# Patient Record
Sex: FEMALE | Race: WHITE | URBAN - METROPOLITAN AREA
[De-identification: names, ages, dates, MRNs, and addresses within clinical notes are randomized per-mention and may not be internally consistent; named-entity substitution may affect disease eponyms.]

---

## 2017-02-17 ENCOUNTER — OFFICE VISIT (OUTPATIENT)
Dept: FAMILY MEDICINE CLINIC | Age: 22
End: 2017-02-17

## 2017-02-17 VITALS
SYSTOLIC BLOOD PRESSURE: 114 MMHG | HEART RATE: 111 BPM | HEIGHT: 65 IN | BODY MASS INDEX: 22.49 KG/M2 | WEIGHT: 135 LBS | DIASTOLIC BLOOD PRESSURE: 70 MMHG | RESPIRATION RATE: 16 BRPM | OXYGEN SATURATION: 100 % | TEMPERATURE: 99.7 F

## 2017-02-17 DIAGNOSIS — B08.5 HERPANGINA: Primary | ICD-10-CM

## 2017-02-17 DIAGNOSIS — J06.9 VIRAL UPPER RESPIRATORY ILLNESS: ICD-10-CM

## 2017-02-17 RX ORDER — PETROLATUM,WHITE
OINTMENT IN PACKET (GRAM) TOPICAL AS NEEDED
Qty: 20 G | Refills: 0 | Status: SHIPPED | OUTPATIENT
Start: 2017-02-17 | End: 2019-01-23 | Stop reason: ALTCHOICE

## 2017-02-17 RX ORDER — ACETAMINOPHEN 325 MG/1
TABLET ORAL
COMMUNITY
End: 2019-01-23

## 2017-02-17 NOTE — LETTER
NOTIFICATION RETURN TO WORK / SCHOOL 
 
2/17/2017 5:01 PM 
 
Ms. Rick Frank [address] [place] [facility] [id number] To Whom It May Concern: 
 
Rick Frank is currently under the care of [employer]. She will return to work/school on: 2/20/17 If there are questions or concerns please have the patient contact our office. Sincerely, Jaylon Carson MD

## 2017-02-17 NOTE — PROGRESS NOTES
Sick since Monday  Febrile on Tuesday and Wednesday  Nasal congestion, cough, + sore throat  Attends [employer]    Woke up on Wednesday with cracked lips    Tmax 101.5 Tuesday    Today afebrile    No rash    Suspect viral syndrome    Recommend magic mouthwash    F/U prn    I reviewed with the resident the medical history and the resident's findings on the physical examination. I discussed with the resident the patient's diagnosis and concur with the plan.

## 2017-02-17 NOTE — MR AVS SNAPSHOT
Visit Information Date & Time Provider Department Dept. Phone Encounter #  
 2/17/2017  3:25 PM Ike Delgadillo, Chiquis Wheat [phone] [id number] Upcoming Health Maintenance Date Due INFLUENZA AGE 9 TO ADULT 8/1/2016 PAP AKA CERVICAL CYTOLOGY 9/28/2016 DTaP/Tdap/Td series (7 - Td) 4/21/2026 Allergies as of 2/17/2017  Review Complete On: 2/17/2017 By: Ike Delgadillo MD  
 No Known Allergies Current Immunizations  Never Reviewed Name Date DTaP 10/19/1999, 5/14/1997, 3/25/1996, 1/29/1996, 1995 HPV 9/26/2014, 8/27/2014 HPV (Quad) 4/3/2015 10:36 AM  
 Hep A Vaccine 8/27/2014 Hep B Vaccine 7/5/1996, 1/29/1996, 1995 Hib 9/27/1996, 3/25/1996, 1/29/1996, 1995 Influenza Vaccine 11/24/2010, 11/25/2008, 11/21/2007 MMR 10/19/1999, 1/27/1997 Meningococcal Vaccine 8/27/2014, 8/21/2007 Pneumococcal Vaccine (Unspecified Type) 10/19/1999, 3/25/1996, 1/29/1996, 1995 Tdap 4/21/2016, 8/12/1997 Varicella Virus Vaccine 8/27/2014, 11/12/1997 Not reviewed this visit Vitals BP Pulse Temp Resp Height(growth percentile) Weight(growth percentile) 114/70 (!) 111 99.7 °F (37.6 °C) (Oral) 16 5' 5\" (1.651 m) 135 lb (61.2 kg) SpO2 BMI OB Status Smoking Status 100% 22.47 kg/m2 Having regular periods Never Smoker BMI and BSA Data Body Mass Index Body Surface Area  
 22.47 kg/m 2 1.68 m 2 Preferred Pharmacy Pharmacy Name Phone [facility] [address] [phone] Your Updated Medication List  
  
   
This list is accurate as of: 2/17/17  5:01 PM.  Always use your most recent med list.  
  
  
  
  
 levonorgestrel 20 mcg/24 hr (5 years) IUD Commonly known as:  MIRENA  
1 Each by IntraUTERine route once. magic mouthwash solution Magic mouth wash  Maalox Lidocaine 2% viscous  Diphenhydramine oral solution   Pharmacy to mix equal portions of ingredients to a total volume as indicated in the dispense amount. TYLENOL 325 mg tablet Generic drug:  acetaminophen Take  by mouth every four (4) hours as needed for Pain.  
  
 white petrolatum topical onitment Commonly known as:  VASELINE Apply  to affected area as needed for Lubrication. Prescriptions Printed Refills  
 magic mouthwash solution 0 Sig: Magic mouth wash Maalox Lidocaine 2% viscous Diphenhydramine oral solution Pharmacy to mix equal portions of ingredients to a total volume as indicated in the dispense amount. Class: Print Prescriptions Sent to Pharmacy Refills  
 white petrolatum (VASELINE) topical onitment 0 Sig: Apply  to affected area as needed for Lubrication. Class: Normal  
 Pharmacy: [address] [place], [place] [address] [facility] #: [phone] Route: Topical  
  
Patient Instructions Herpangina in Children: Care Instructions Your Care Instructions Herpangina (say \"NOP-moad-RF-nuh\") is an illness that is caused by a virus. It causes sores inside the mouth, a sore throat, and a high fever. Adults usually do not get it. Herpangina easily spreads to other children through exposure to a sick child's runny nose or saliva. While herpangina can make your child feel very ill for a few days, this illness usually clears up within a week. The most common concern is that your child may get dehydrated because it is painful to swallow. You can use home treatment to reduce your child's pain and discomfort. Since this illness is caused by a virus, antibiotic medicine is not used to treat it. Follow-up care is a key part of your child's treatment and safety. Be sure to make and go to all appointments, and call your doctor if your child is having problems.  It's also a good idea to know your child's test results and keep a list of the medicines your child takes. How can you care for your child at home? · Give acetaminophen (Tylenol) or ibuprofen (Advil, Motrin) for fever, pain, or fussiness. Read and follow all instructions on the label. Do not give aspirin to anyone younger than 20. It has been linked to Reye syndrome, a serious illness. · Do not give your child over-the-counter antidiarrhea or upset-stomach medicines without talking to your doctor first. Kyra Newman not give Pepto-Bismol or other medicines that contain salicylates, a form of aspirin, or aspirin. Aspirin has been linked to Reye syndrome, a serious illness. · Make sure your child rests. Keep your child home as long as he or she has a fever. · Have your child drink plenty of fluids. Warm fluids such as soup, warm water, or warm lemonade may ease throat pain. Ice cream, gelatin dessert, and sherbet can also soothe the throat. · If your child is eating solids, try offering bland foods, such as yogurt and warm cereal. 
· Watch for and treat signs of dehydration, which means that the body has lost too much water. Your child's mouth may feel very dry. He or she may have sunken eyes with few tears when crying. Your child may lack energy and want to be held a lot. He or she may not urinate as often as usual. 
· Give your child lots of fluids, enough so that the urine is light yellow or clear like water. This is very important if your child is vomiting or has diarrhea. Give your child sips of water or drinks such as Pedialyte or Infalyte. These drinks contain a mix of salt, sugar, and minerals. You can buy them at drugstores or grocery stores. Give these drinks as long as your child is throwing up or has diarrhea. Do not use them as the only source of liquids or food for more than 12 to 24 hours. · Wash your hands after changing diapers and before you touch food. Have your child wash his or her hands after using the toilet and before eating. When should you call for help? Call 911 anytime you think your child may need emergency care. For example, call if: 
· Your child has severe trouble breathing. Signs may include the chest sinking in, using belly muscles to breathe, or nostrils flaring while your child is struggling to breathe. · Your child is confused, does not know where he or she is, or is extremely sleepy or hard to wake up. · Your child passes out (loses consciousness). · Your child has a seizure. Call your doctor now or seek immediate medical care if: 
· Your child has a fever with a stiff neck or a severe headache. · Your child still has a fever after 5 days of home treatment. · Your child has signs of needing more fluids. These signs include sunken eyes with few tears, a dry mouth with little or no spit, and little or no urine for 6 hours. Watch closely for changes in your child's health, and be sure to contact your doctor if: 
· Your child's mouth sores and sore throat get worse or are not improving. · Your child does not get better as expected. Where can you learn more? Go to http://osmani-stanley.info/. Enter G012 in the search box to learn more about \"Herpangina in Children: Care Instructions. \" Current as of: July 26, 2016 Content Version: 11.1 © 0814-6978 [employer]. Care instructions adapted under license by [employer] (which disclaims liability or warranty for this information). If you have questions about a medical condition or this instruction, always ask your healthcare professional. [place] [address] any warranty or liability for your use of this information. Introducing hospitals & HEALTH SERVICES! [facility] introduces Care Thread patient portal. Now you can access parts of your medical record, email your doctor's office, and request medication refills online. 1. In your internet browser, go to [email or web address] 2. Click on the First Time User? Click Here link in the Sign In box. You will see the New Member Sign Up page. 3. Enter your [employer] Access Code exactly as it appears below. You will not need to use this code after youve completed the sign-up process. If you do not sign up before the expiration date, you must request a new code. · [employer] Access Code: [email or web address] Expires: 5/18/2017  3:33 PM 
 
4. Enter the last four digits of your Social Security Number (xxxx) and Date of Birth (mm/dd/yyyy) as indicated and click Submit. You will be taken to the next sign-up page. 5. Create a [employer] Logic ID. This will be your [employer] login ID and cannot be changed, so think of one that is secure and easy to remember. 6. Create a [employer] password. You can change your password at any time. 7. Enter your Password Reset Question and Answer. This can be used at a later time if you forget your password. 8. Enter your e-mail address. You will receive e-mail notification when new information is available in [address] 9. Click Sign Up. You can now view and download portions of your medical record. 10. Click the Download Summary menu link to download a portable copy of your medical information. If you have questions, please visit the Frequently Asked Questions section of the [employer] website. Remember, [employer] is NOT to be used for urgent needs. For medical emergencies, dial 911. Now available from your iPhone and Android! Please provide this summary of care documentation to your next provider. Your primary care clinician is listed as Sumit Gamble. If you have any questions after today's visit, please call [phone].

## 2017-02-17 NOTE — PROGRESS NOTES
Chief Complaint   Patient presents with    Fever     times 4 days    Headache     head, neck and ears     1. Have you been to the ER, urgent care clinic since your last visit? Hospitalized since your last visit? No      2. Have you seen or consulted any other health care providers outside of the [address] [place] [address] since your last visit? Include any pap smears or colon screening.  No

## 2017-02-17 NOTE — PROGRESS NOTES
Cindy Joyce is a 24 y.o. female    Issues discussed today include:    1)  Cold sxs:  Monday 2/13 started with general fatigue and fever, chills. Tmax 101.5F on Tues 2/14. Woke up 2/15 with red, swollen, cracked lips. Have since scabbed over. Never blistered or ulcerated. Lips are painful 4/10. Putting coconut oil and lip balm on lips with not much change. Lip swelling going down. Bottom lip worse than top. + fatigue, sleeping more than usually. Taken tylenol and advil for fevr with relief. + cough, nasal congestion, post-nasal drainage and mild sore throat. No rashes. No red or swollen tongue. No bleeding gums. Tried generic form of mucinex, helped a little. No smoking or etoh. No other drugs. 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 Pt wants to know her lips will not be like this permanently. Data reviewed or ordered today:       Other problems include:  Patient Active Problem List   Diagnosis Code    Menorrhagia N92.0    Dysmenorrhea N94.6       Medications:  Current Outpatient Prescriptions   Medication Sig Dispense Refill    levonorgestrel (MIRENA) 20 mcg/24 hr (5 years) IUD 1 Each by IntraUTERine route once.  acetaminophen (TYLENOL) 325 mg tablet Take  by mouth every four (4) hours as needed for Pain.  magic mouthwash solution Magic mouth wash   Maalox  Lidocaine 2% viscous   Diphenhydramine oral solution     Pharmacy to mix equal portions of ingredients to a total volume as indicated in the dispense amount. 300 mL 0    white petrolatum (VASELINE) topical onitment Apply  to affected area as needed for Lubrication. 20 g 0       Allergies:  No Known Allergies    LMP:  No LMP recorded. Social History     Social History    Marital status: SINGLE     Spouse name: N/A    Number of children: N/A    Years of education: N/A     Occupational History    Not on file.      Social History Main Topics    Smoking status: Never Smoker    Smokeless tobacco: Never Used    Alcohol use No    Drug use: No    Sexual activity: Yes     Birth control/ protection: Pill     Other Topics Concern    Not on file     Social History Narrative       Family History   Problem Relation Age of Onset    Diabetes Mother     Hypertension Mother     Cancer Maternal Aunt     Diabetes Maternal Grandmother     Diabetes Maternal Grandfather        ROS:   Chest Pain:  No  SOB:  No      Physical Exam  Visit Vitals    /70    Pulse (!) 111    Temp 99.7 °F (37.6 °C) (Oral)    Resp 16    Ht 5' 5\" (1.651 m)    Wt 135 lb (61.2 kg)    SpO2 100%    BMI 22.47 kg/m2     BP Readings from Last 3 Encounters:   02/17/17 114/70   09/08/16 140/78   06/23/16 129/69     Constitutional: Appears well,  No acute distress, Vitals noted  Psychiatric:  Affect anxious, tearful at times, Alert and Oriented to person/place/time  Eyes:  Conjunctiva clear, no drainage  ENT:  External ears and nose normal, Ms grey and non-bulging bilaterally. OP without erythema, edema or exudate. Bilateral nares without active drainage, edema or polyps. Mouth: Teeth appear healthy, Mucous membranes mildly dry. Gums mildly inflammed. Multiple aphthous ulcers in inner upper and lower lips. Bottom lip mildly edematous with three sores with overlying eschar. Neck:  General inspection normal. Supple. No lymphadenopathy. Lungs:  Clear to auscultation, good respiratory effort, no wheezes, rales or rhonchi  Heart:  Tachycardic with , Normal S1 and S2,  Regular rhythm. 2/6 systolic murmurs, no rubs or gallops. Abdomen: Soft, flat, + BS, nondistended, non-tender, no HSM or masses  Extremities:  Without edema, good peripheral pulses  Skin:  Warm to palpation, without rashes            Assessment/Plan:      ICD-10-CM ICD-9-CM    1. Herpangina B08.5 074.0    2.  Viral upper respiratory illness J06.9 465.9     B97.89       Reassurance given  Magic mouthwash for swish and spit + application to lips  Use Vaseline several times daily to keep moist and for healing  Continue motrin, tylenol prn for fever - Please RTC if high fevers > 102F persistent or other new sxs  Suspect flow murmur d/t dehydration and viral illness - will consider echo is persistent at wellness visits    Follow-up Disposition:  Return if symptoms worsen or fail to improve. AVS was printed, given to patient and briefly discussed prior to patient's departure from the office today. Patient discussed with attendings, Dr. Paresh Natarajan and Phil Spears.  Susanna Epps MD  [address] [facility]  [address]  [address]

## 2017-02-17 NOTE — PATIENT INSTRUCTIONS
Herpangina in Children: Care Instructions  Your Care Instructions  Herpangina (say \"DDN-qkna-JM-nuh\") is an illness that is caused by a virus. It causes sores inside the mouth, a sore throat, and a high fever. Adults usually do not get it. Herpangina easily spreads to other children through exposure to a sick child's runny nose or saliva. While herpangina can make your child feel very ill for a few days, this illness usually clears up within a week. The most common concern is that your child may get dehydrated because it is painful to swallow. You can use home treatment to reduce your child's pain and discomfort. Since this illness is caused by a virus, antibiotic medicine is not used to treat it. Follow-up care is a key part of your child's treatment and safety. Be sure to make and go to all appointments, and call your doctor if your child is having problems. It's also a good idea to know your child's test results and keep a list of the medicines your child takes. How can you care for your child at home? · Give acetaminophen (Tylenol) or ibuprofen (Advil, Motrin) for fever, pain, or fussiness. Read and follow all instructions on the label. Do not give aspirin to anyone younger than 20. It has been linked to Reye syndrome, a serious illness. · Do not give your child over-the-counter antidiarrhea or upset-stomach medicines without talking to your doctor first. Leonardo Coates not give Pepto-Bismol or other medicines that contain salicylates, a form of aspirin, or aspirin. Aspirin has been linked to Reye syndrome, a serious illness. · Make sure your child rests. Keep your child home as long as he or she has a fever. · Have your child drink plenty of fluids. Warm fluids such as soup, warm water, or warm lemonade may ease throat pain. Ice cream, gelatin dessert, and sherbet can also soothe the throat.   · If your child is eating solids, try offering bland foods, such as yogurt and warm cereal.  · Watch for and treat signs of dehydration, which means that the body has lost too much water. Your child's mouth may feel very dry. He or she may have sunken eyes with few tears when crying. Your child may lack energy and want to be held a lot. He or she may not urinate as often as usual.  · Give your child lots of fluids, enough so that the urine is light yellow or clear like water. This is very important if your child is vomiting or has diarrhea. Give your child sips of water or drinks such as Pedialyte or Infalyte. These drinks contain a mix of salt, sugar, and minerals. You can buy them at drugstores or grocery stores. Give these drinks as long as your child is throwing up or has diarrhea. Do not use them as the only source of liquids or food for more than 12 to 24 hours. · Wash your hands after changing diapers and before you touch food. Have your child wash his or her hands after using the toilet and before eating. When should you call for help? Call 911 anytime you think your child may need emergency care. For example, call if:  · Your child has severe trouble breathing. Signs may include the chest sinking in, using belly muscles to breathe, or nostrils flaring while your child is struggling to breathe. · Your child is confused, does not know where he or she is, or is extremely sleepy or hard to wake up. · Your child passes out (loses consciousness). · Your child has a seizure. Call your doctor now or seek immediate medical care if:  · Your child has a fever with a stiff neck or a severe headache. · Your child still has a fever after 5 days of home treatment. · Your child has signs of needing more fluids. These signs include sunken eyes with few tears, a dry mouth with little or no spit, and little or no urine for 6 hours. Watch closely for changes in your child's health, and be sure to contact your doctor if:  · Your child's mouth sores and sore throat get worse or are not improving.   · Your child does not get better as expected. Where can you learn more? Go to http://osmani-stanley.info/. Enter G012 in the search box to learn more about \"Herpangina in Children: Care Instructions. \"  Current as of: July 26, 2016  Content Version: 11.1  © 9469-9902 [employer] Incorporated. Care instructions adapted under license by "[employer] (which disclaims liability or warranty for this information). If you have questions about a medical condition or this instruction, always ask your healthcare professional. [address] any warranty or liability for your use of this information.

## 2017-11-06 ENCOUNTER — OFFICE VISIT (OUTPATIENT)
Dept: FAMILY MEDICINE CLINIC | Age: 22
End: 2017-11-06

## 2017-11-06 VITALS
DIASTOLIC BLOOD PRESSURE: 73 MMHG | OXYGEN SATURATION: 100 % | RESPIRATION RATE: 18 BRPM | WEIGHT: 147 LBS | HEIGHT: 65 IN | BODY MASS INDEX: 24.49 KG/M2 | HEART RATE: 78 BPM | TEMPERATURE: 98.4 F | SYSTOLIC BLOOD PRESSURE: 122 MMHG

## 2017-11-06 DIAGNOSIS — N92.0 MENORRHAGIA WITH REGULAR CYCLE: ICD-10-CM

## 2017-11-06 DIAGNOSIS — M25.649 STIFFNESS OF HAND JOINT, UNSPECIFIED LATERALITY: ICD-10-CM

## 2017-11-06 DIAGNOSIS — Z13.0 SCREENING FOR IRON DEFICIENCY ANEMIA: ICD-10-CM

## 2017-11-06 DIAGNOSIS — Z78.9 CONSUMES A VEGAN DIET: Primary | ICD-10-CM

## 2017-11-06 NOTE — PROGRESS NOTES
Jazmin Reyes  25 y.o. female  1995  [address] [facility]  <[id number]>   [address]: Progress Note  Mikaela Collins MD       Encounter Date: 11/6/2017    Chief Complaint   Patient presents with    Referral Request     Dr. Starr Jain appointment Nov 30     History of Present Illness   Jazmin Reyes is a 25 y.o. female who presents to clinic today for referral for WWE to OB GYN. 2. She is also concerned about kervin hand stiffness. She is concerned for arthritis. She is ambidextrous but writes with her left hand. Denies any FHx of RA. Denies any swelling does note some stiffness. 3. She presents with her mother and desires labs today. Review of Systems   Review of Systems   Constitutional: Negative for fever. Musculoskeletal: Positive for joint pain (kervin hands). All other systems reviewed and are negative. Vitals/Objective:     Vitals:    11/06/17 0937   BP: 122/73   Pulse: 78   Resp: 18   Temp: 98.4 °F (36.9 °C)   TempSrc: Oral   SpO2: 100%   Weight: 147 lb (66.7 kg)   Height: 5' 5\" (1.651 m)     Body mass index is 24.46 kg/(m^2). Physical Exam   Constitutional: She appears well-developed and well-nourished. No distress. Neck: Neck supple. No thyromegaly present. Cardiovascular: Normal rate, regular rhythm and normal heart sounds. Exam reveals no gallop and no friction rub. No murmur heard. Pulmonary/Chest: Effort normal and breath sounds normal. No respiratory distress. She has no wheezes. She has no rales. Abdominal: Soft. Bowel sounds are normal. She exhibits no distension. There is no tenderness. There is no rebound and no guarding. Musculoskeletal:        Right wrist: Normal. She exhibits normal range of motion, no tenderness, no bony tenderness and no swelling. Left wrist: Normal. She exhibits normal range of motion, no tenderness, no bony tenderness and no swelling.         Right hand: Normal. She exhibits normal range of motion, no tenderness, no bony tenderness, no deformity and no swelling. Left hand: She exhibits normal range of motion, no tenderness, no bony tenderness, normal capillary refill and no swelling. Skin: She is not diaphoretic. No results found for this or any previous visit (from the past 24 hour(s)). Assessment and Plan:   1. Consumes a vegan diet  Routine labs. - METABOLIC PANEL, COMPREHENSIVE  - LIPID PANEL  - VITAMIN B12  - FERRITIN  - FOLATE    2. Menorrhagia with regular cycle  Routine labs. Referral placed. Per chart review OB GYN placed the IUD. Will also perform the pap smear as well. - CBC WITH AUTOMATED DIFF  - [facility] ref [facility]    3. Stiffness of hand joint, unspecified laterality  - XR HAND LT MIN 3 V; Future  - XR HAND RT MIN 3 V; Future  - RHEUMATOID FACTOR, QL  - CYCLIC CITRUL PEPTIDE AB, IGG  - SED RATE (ESR)    4. Screening for iron deficiency anemia  - CBC WITH AUTOMATED DIFF  - IRON PROFILE    I have discussed the diagnosis with the patient and the intended plan as seen in the above orders. she has expressed understanding. The patient has received an after-visit summary and questions were answered concerning future plans. I have discussed medication side effects and warnings with the patient as well. Follow-up Disposition:  Return if symptoms worsen or fail to improve. Electronically Signed: Sunil Wilkinson MD     History   Patients past medical, surgical and family histories were reviewed and updated.     Past Medical History:   Diagnosis Date    Acne     Dysmenorrhea     Menorrhagia      Past Surgical History:   Procedure Laterality Date    HX TYMPANOSTOMY      HX WISDOM TEETH EXTRACTION       Family History   Problem Relation Age of Onset    Diabetes Mother     Hypertension Mother     Cancer Maternal Aunt     Diabetes Maternal Grandmother     Diabetes Maternal Grandfather      Social History     Social History    Marital status: SINGLE     Spouse name: N/A    Number of children: N/A    Years of education: N/A     Occupational History    Not on file. Social History Main Topics    Smoking status: Never Smoker    Smokeless tobacco: Never Used    Alcohol use No    Drug use: No    Sexual activity: Yes     Birth control/ protection: Pill     Other Topics Concern    Not on file     Social History Narrative            Current Medications/Allergies     Current Outpatient Prescriptions   Medication Sig Dispense Refill    levonorgestrel (MIRENA) 20 mcg/24 hr (5 years) IUD 1 Each by IntraUTERine route once.  acetaminophen (TYLENOL) 325 mg tablet Take  by mouth every four (4) hours as needed for Pain.  magic mouthwash solution Magic mouth wash   Maalox  Lidocaine 2% viscous   Diphenhydramine oral solution     Pharmacy to mix equal portions of ingredients to a total volume as indicated in the dispense amount. 300 mL 0    white petrolatum (VASELINE) topical onitment Apply  to affected area as needed for Lubrication.  20 g 0     No Known Allergies

## 2017-11-06 NOTE — LETTER
11/20/2017 8:51 AM 
 
Ms. Ricardo Tejeda [address] [place] [address] Dear Ricardo Tejeda: 
 
Please find your most recent results below. Resulted Orders RHEUMATOID FACTOR, QL Result Value Ref Range Rheumatoid factor <10.0 0.0 - 13.9 IU/mL Narrative Performed at:  [address]  [id number] : Anisa Sanches MD, Phone:  [phone] CBC WITH AUTOMATED DIFF Result Value Ref Range WBC 6.7 3.4 - 10.8 x10E3/uL  
 RBC 4.23 3.77 - 5.28 x10E6/uL HGB 13.1 11.1 - 15.9 g/dL HCT 39.1 34.0 - 46.6 % MCV 92 79 - 97 fL  
 MCH 31.0 26.6 - 33.0 pg  
 MCHC 33.5 31.5 - 35.7 g/dL  
 RDW 12.8 12.3 - 15.4 % PLATELET 350 101 - 815 x10E3/uL NEUTROPHILS 58 Not Estab. % Lymphocytes 28 Not Estab. % MONOCYTES 11 Not Estab. % EOSINOPHILS 2 Not Estab. % BASOPHILS 0 Not Estab. %  
 ABS. NEUTROPHILS 3.9 1.4 - 7.0 x10E3/uL Abs Lymphocytes 1.8 0.7 - 3.1 x10E3/uL  
 ABS. MONOCYTES 0.7 0.1 - 0.9 x10E3/uL  
 ABS. EOSINOPHILS 0.1 0.0 - 0.4 x10E3/uL  
 ABS. BASOPHILS 0.0 0.0 - 0.2 x10E3/uL IMMATURE GRANULOCYTES 1 Not Estab. %  
 ABS. IMM. GRANS. 0.0 0.0 - 0.1 x10E3/uL Narrative Performed at:  [address]  [id number] : Anisa Sanches MD, Phone:  [phone] METABOLIC PANEL, COMPREHENSIVE Result Value Ref Range Glucose 98 65 - 99 mg/dL BUN 10 6 - 20 mg/dL Creatinine 0.67 0.57 - 1.00 mg/dL GFR est non- >59 mL/min/1.73 GFR est  >59 mL/min/1.73  
 BUN/Creatinine ratio 15 9 - 23 Sodium 143 134 - 144 mmol/L Potassium 4.3 3.5 - 5.2 mmol/L Chloride 103 96 - 106 mmol/L  
 CO2 22 18 - 29 mmol/L Calcium 9.2 8.7 - 10.2 mg/dL Protein, total 7.3 6.0 - 8.5 g/dL Albumin 4.4 3.5 - 5.5 g/dL GLOBULIN, TOTAL 2.9 1.5 - 4.5 g/dL A-G Ratio 1.5 1.2 - 2.2 Bilirubin, total 0.4 0.0 - 1.2 mg/dL Alk. phosphatase 101 39 - 117 IU/L  
 AST (SGOT) 17 0 - 40 IU/L  
 ALT (SGPT) 19 0 - 32 IU/L Narrative Performed at:  [address]  [id number] : Chung Field MD, Phone:  [phone] LIPID PANEL Result Value Ref Range Cholesterol, total 145 100 - 199 mg/dL Triglyceride 81 0 - 149 mg/dL HDL Cholesterol 51 >39 mg/dL VLDL, calculated 16 5 - 40 mg/dL LDL, calculated 78 0 - 99 mg/dL Narrative Performed at:  [address]  [id number] : Chung Field MD, Phone:  [phone] VITAMIN B12 Result Value Ref Range Vitamin B12 240 211 - 946 pg/mL Narrative Performed at:  [address]  [id number] : Chung Field MD, Phone:  [phone] FERRITIN Result Value Ref Range Ferritin 50 15 - 150 ng/mL Narrative Performed at:  [address]  [id number] : Chung Field MD, Phone:  [phone] FOLATE Result Value Ref Range Folate >20.0 >3.0 ng/mL Comment: A serum folate concentration of less than 3.1 ng/mL is 
considered to represent clinical deficiency. Narrative Performed at:  [address]  [id number] : Chung Field MD, Phone:  [phone] IRON PROFILE Result Value Ref Range TIBC 376 250 - 450 ug/dL UIBC 316 131 - 425 ug/dL Iron 60 27 - 159 ug/dL Iron % saturation 16 15 - 55 % Narrative Performed at:  [address]  [id number] : Chung Field MD, Phone:  [phone] CYCLIC CITRUL PEPTIDE AB, IGG Result Value Ref Range CCP Antibodies IgG/IgA 7 0 - 19 units Comment:  
                             Negative               <20 Weak positive      20 - 39 Moderate positive  40 - 59 Strong positive        >59 Narrative Performed at:  [address]  [id number] : Kassandra Boo MD, Phone:  [phone] SED RATE (ESR) Result Value Ref Range Sed rate (ESR) 3 0 - 32 mm/hr Narrative Performed at:  [address]  [id number] : Kassandra Boo MD, Phone:  [phone] CVD REPORT Result Value Ref Range INTERPRETATION Note Comment:  
   Supplemental report is available. Narrative Performed at:  [address] [place] [address] [address] [place] [address]  [id number] : Luis Batres PhD, Phone:  [phone] RECOMMENDATIONS: 
all of your results are normal including your x rays. Have a great upcoming holiday season!!!  
 
Please call me if you have any questions: [phone] Sincerely, Evangelina Medina MD

## 2017-11-06 NOTE — PROGRESS NOTES
Chief Complaint   Patient presents with    Referral Request     Dr. Maryjo Jaquez appointment Nov 30         1. Have you been to the ER, urgent care clinic since your last visit? Hospitalized since your last visit? No    2. Have you seen or consulted any other health care providers outside of the [address] [place] [address] since your last visit? Include any pap smears or colon screening.  No

## 2017-11-06 NOTE — PATIENT INSTRUCTIONS
Hand Pain: Care Instructions  Your Care Instructions    Common causes of hand pain are overuse and injuries, such as might happen during sports or home repair projects. Everyday wear and tear, especially as you get older, also can cause hand pain. Most minor hand injuries will heal on their own, and home treatment is usually all you need to do. If you have sudden and severe pain, you may need tests and treatment. Follow-up care is a key part of your treatment and safety. Be sure to make and go to all appointments, and call your doctor if you are having problems. It's also a good idea to know your test results and keep a list of the medicines you take. How can you care for yourself at home? · Take pain medicines exactly as directed. ¨ If the doctor gave you a prescription medicine for pain, take it as prescribed. ¨ If you are not taking a prescription pain medicine, ask your doctor if you can take an over-the-counter medicine. · Rest and protect your hand. Take a break from any activity that may cause pain. · Put ice or a cold pack on your hand for 10 to 20 minutes at a time. Put a thin cloth between the ice and your skin. · Prop up the sore hand on a pillow when you ice it or anytime you sit or lie down during the next 3 days. Try to keep it above the level of your heart. This will help reduce swelling. · If your doctor recommends a sling, splint, or elastic bandage to support your hand, wear it as directed. When should you call for help? Call 911 anytime you think you may need emergency care. For example, call if:  ? · Your hand turns cool or pale or changes color. ?Call your doctor now or seek immediate medical care if:  ? · You cannot move your hand. ? · Your hand pops, moves out of its normal position, and then returns to its normal position. ? · You have signs of infection, such as:  ¨ Increased pain, swelling, warmth, or redness. ¨ Red streaks leading from the sore area.   ¨ Pus draining from a place on your hand. ¨ A fever. ? · Your hand feels numb or tingly. ? Watch closely for changes in your health, and be sure to contact your doctor if:  ? · Your hand feels unstable when you try to use it. ? · You do not get better as expected. ? · You have any new symptoms, such as swelling. ? · Bruises from an injury to your hand last longer than 2 weeks. Where can you learn more? Go to http://osmani-stanley.info/. Enter R273 in the search box to learn more about \"Hand Pain: Care Instructions. \"  Current as of: March 20, 2017  Content Version: 11.4  © 1856-9019 [employer]. Care instructions adapted under license by [employer] (which disclaims liability or warranty for this information). If you have questions about a medical condition or this instruction, always ask your healthcare professional. [place] [address] any warranty or liability for your use of this information. Intrauterine Device (IUD) for Birth Control: Care Instructions  Your Care Instructions    The intrauterine device (IUD) is used to prevent pregnancy. It's a small, plastic, T-shaped device. Your doctor places the IUD in your uterus. You are using either a hormonal IUD or a copper IUD. · Hormonal IUDs prevent pregnancy for 3 to 5 years, depending on which IUD is used. Once you have it, you don't have to do anything else to prevent pregnancy. · The copper IUD prevents pregnancy for 10 years. Once you have it, you don't have to do anything to prevent pregnancy. A string tied to the end of the IUD hangs down through the opening of the uterus (called the cervix) into the vagina. You can check that the IUD is in place by feeling for the string. The IUD usually stays in the uterus until your doctor removes it. Follow-up care is a key part of your treatment and safety. Be sure to make and go to all appointments, and call your doctor if you are having problems.  It's also a good idea to know your test results and keep a list of the medicines you take. How can you care for yourself at home? How do you use the IUD? · Your doctor inserts the IUD. This takes only a few minutes and can be done at your doctor's office. · Your doctor may have you feel for the IUD string right after insertion, to be sure you know what it feels like. · Check for the string after every period. ¨ Insert a finger into your vagina and feel for the cervix, which is at the top of the vagina and feels harder than the rest of your vagina (some women say it feels like the tip of your nose). ¨ You should be able to feel the thin, plastic string coming out of the opening of your cervix. If you cannot feel the string, it doesn't always mean that the IUD is out of place. Sometimes the string is just difficult to feel or has been pulled up into the cervical canal (which will not harm you). · Your doctor may want to see you 4 to 6 weeks after the IUD insertion, to make sure it is in place. What if you think the IUD is not in place? Always read the label for specific instructions. Here are some basic guidelines:  · Call your doctor and use backup birth control, such as a condom, or don't have intercourse until you know the IUD is working. · If you have had intercourse, you can use emergency contraception, such as the morning-after pill (Plan B). You can use emergency contraception for up to 5 days after having had intercourse, but it works best if you take it right away. What else do you need to know? · The IUD has side effects. ¨ The hormonal IUD usually reduces menstrual flow and cramping over time. It can also cause spotting, mood swings, and breast tenderness. ¨ The copper IUD can cause longer and heavier periods. · After an IUD is first put in, you may have some mild cramping and light spotting for 1 to 2 days.   · The IUD doesn't protect against sexually transmitted infections (STIs), such as herpes or HIV/AIDS. If you're not sure whether your sex partner might have an STI, use a condom to protect against disease. When should you call for help? Call your doctor now or seek immediate medical care if:  ? · You have pain in your belly or pelvis. ? · You have severe vaginal bleeding. This means that you are soaking through your usual pads or tampons each hour for 2 or more hours. ? · You have vaginal discharge that smells bad.   ? · You have a fever. ? Watch closely for changes in your health, and be sure to contact your doctor if you have any problems. Where can you learn more? Go to http://osmani-stanley.info/. Enter O674 in the search box to learn more about \"Intrauterine Device (IUD) for Birth Control: Care Instructions. \"  Current as of: March 16, 2017  Content Version: 11.4  © 1261-7287 [employer]. Care instructions adapted under license by [employer] (which disclaims liability or warranty for this information). If you have questions about a medical condition or this instruction, always ask your healthcare professional. [address] any warranty or liability for your use of this information.

## 2017-11-08 LAB
ALBUMIN SERPL-MCNC: 4.4 G/DL (ref 3.5–5.5)
ALBUMIN/GLOB SERPL: 1.5 {RATIO} (ref 1.2–2.2)
ALP SERPL-CCNC: 101 IU/L (ref 39–117)
ALT SERPL-CCNC: 19 IU/L (ref 0–32)
AST SERPL-CCNC: 17 IU/L (ref 0–40)
BASOPHILS # BLD AUTO: 0 X10E3/UL (ref 0–0.2)
BASOPHILS NFR BLD AUTO: 0 %
BILIRUB SERPL-MCNC: 0.4 MG/DL (ref 0–1.2)
BUN SERPL-MCNC: 10 MG/DL (ref 6–20)
BUN/CREAT SERPL: 15 (ref 9–23)
CALCIUM SERPL-MCNC: 9.2 MG/DL (ref 8.7–10.2)
CCP IGA+IGG SERPL IA-ACNC: 7 UNITS (ref 0–19)
CHLORIDE SERPL-SCNC: 103 MMOL/L (ref 96–106)
CHOLEST SERPL-MCNC: 145 MG/DL (ref 100–199)
CO2 SERPL-SCNC: 22 MMOL/L (ref 18–29)
CREAT SERPL-MCNC: 0.67 MG/DL (ref 0.57–1)
EOSINOPHIL # BLD AUTO: 0.1 X10E3/UL (ref 0–0.4)
EOSINOPHIL NFR BLD AUTO: 2 %
ERYTHROCYTE [DISTWIDTH] IN BLOOD BY AUTOMATED COUNT: 12.8 % (ref 12.3–15.4)
ERYTHROCYTE [SEDIMENTATION RATE] IN BLOOD BY WESTERGREN METHOD: 3 MM/HR (ref 0–32)
FERRITIN SERPL-MCNC: 50 NG/ML (ref 15–150)
FOLATE SERPL-MCNC: >20 NG/ML
GFR SERPLBLD CREATININE-BSD FMLA CKD-EPI: 125 ML/MIN/1.73
GFR SERPLBLD CREATININE-BSD FMLA CKD-EPI: 144 ML/MIN/1.73
GLOBULIN SER CALC-MCNC: 2.9 G/DL (ref 1.5–4.5)
GLUCOSE SERPL-MCNC: 98 MG/DL (ref 65–99)
HCT VFR BLD AUTO: 39.1 % (ref 34–46.6)
HDLC SERPL-MCNC: 51 MG/DL
HGB BLD-MCNC: 13.1 G/DL (ref 11.1–15.9)
IMM GRANULOCYTES # BLD: 0 X10E3/UL (ref 0–0.1)
IMM GRANULOCYTES NFR BLD: 1 %
INTERPRETATION, 910389: NORMAL
IRON SATN MFR SERPL: 16 % (ref 15–55)
IRON SERPL-MCNC: 60 UG/DL (ref 27–159)
LDLC SERPL CALC-MCNC: 78 MG/DL (ref 0–99)
LYMPHOCYTES # BLD AUTO: 1.8 X10E3/UL (ref 0.7–3.1)
LYMPHOCYTES NFR BLD AUTO: 28 %
MCH RBC QN AUTO: 31 PG (ref 26.6–33)
MCHC RBC AUTO-ENTMCNC: 33.5 G/DL (ref 31.5–35.7)
MCV RBC AUTO: 92 FL (ref 79–97)
MONOCYTES # BLD AUTO: 0.7 X10E3/UL (ref 0.1–0.9)
MONOCYTES NFR BLD AUTO: 11 %
NEUTROPHILS # BLD AUTO: 3.9 X10E3/UL (ref 1.4–7)
NEUTROPHILS NFR BLD AUTO: 58 %
PLATELET # BLD AUTO: 277 X10E3/UL (ref 150–379)
POTASSIUM SERPL-SCNC: 4.3 MMOL/L (ref 3.5–5.2)
PROT SERPL-MCNC: 7.3 G/DL (ref 6–8.5)
RBC # BLD AUTO: 4.23 X10E6/UL (ref 3.77–5.28)
RHEUMATOID FACT SERPL-ACNC: <10 IU/ML (ref 0–13.9)
SODIUM SERPL-SCNC: 143 MMOL/L (ref 134–144)
TIBC SERPL-MCNC: 376 UG/DL (ref 250–450)
TRIGL SERPL-MCNC: 81 MG/DL (ref 0–149)
UIBC SERPL-MCNC: 316 UG/DL (ref 131–425)
VIT B12 SERPL-MCNC: 240 PG/ML (ref 211–946)
VLDLC SERPL CALC-MCNC: 16 MG/DL (ref 5–40)
WBC # BLD AUTO: 6.7 X10E3/UL (ref 3.4–10.8)

## 2017-11-13 ENCOUNTER — TELEPHONE (OUTPATIENT)
Dept: FAMILY MEDICINE CLINIC | Age: 22
End: 2017-11-13

## 2017-11-13 NOTE — TELEPHONE ENCOUNTER
----- Message from [facility] sent at 11/13/2017  1:34 PM EST -----  Regarding: Dr. Rachelle Sevilla  PT needs a referral for the following:    Dr. Ad Sunshine [id number]  [phone]  [phone]    PT has an appt for 11/30/17 @10:30a. PT justyn Jean can be reached at [phone].

## 2017-11-13 NOTE — TELEPHONE ENCOUNTER
----- Message from [facility] sent at 11/13/2017  3:24 PM EST -----  Regarding: Dr. Tawanda Figueroa    Pt mom would like to get the pt lab and xray results. Pt mom Artist Sheila can be reached at [phone].

## 2017-11-17 NOTE — TELEPHONE ENCOUNTER
/telephone  Received: Today       Bobby Guardado fp Front Office                     Cliff Friend, mother is returning a call to the referral coordinator. Mrs Cee Nieto has put Dr. Alphonse Sharma as the provider. Ally Russell.  Cee Nieto can be reached at [phone].

## 2017-11-17 NOTE — TELEPHONE ENCOUNTER
Appointment to Dr Jaren Danielson (ob-gyn), is \"NOTED\". ...  (11/30/17 @ 10:30am). ... Waiting on patient to change PCP to physician in this office. ... Spoke with mother (ann marie). ...

## 2017-11-17 NOTE — PROGRESS NOTES
Letter: all of your results are normal including your x rays. Have a great upcoming holiday season! !!

## 2017-11-20 ENCOUNTER — TELEPHONE (OUTPATIENT)
Dept: FAMILY MEDICINE CLINIC | Age: 22
End: 2017-11-20

## 2017-11-20 NOTE — TELEPHONE ENCOUNTER
Review encounter of 11/13    Mother, Melinda Guillen, called for update as stating she had PCP changed on Friday. (referral order is showing authorized and closed, but there are no notes in it at this time). Let mother know when this is done. [phone]

## 2017-11-20 NOTE — TELEPHONE ENCOUNTER
[facility] results and [facility]-ray results. Notified mom ann marie that all labs including x-ray are normal per . Notified mom that lab results have been printed. Mom stated she is requesting labs and x-ray results to be mailed. Advised letter with results will be mailed.

## 2017-11-30 ENCOUNTER — OFFICE VISIT (OUTPATIENT)
Dept: OBGYN CLINIC | Age: 22
End: 2017-11-30

## 2017-11-30 VITALS
HEIGHT: 65 IN | WEIGHT: 148 LBS | DIASTOLIC BLOOD PRESSURE: 72 MMHG | BODY MASS INDEX: 24.66 KG/M2 | SYSTOLIC BLOOD PRESSURE: 126 MMHG

## 2017-11-30 DIAGNOSIS — Z01.419 WELL FEMALE EXAM WITH ROUTINE GYNECOLOGICAL EXAM: Primary | ICD-10-CM

## 2017-11-30 NOTE — PATIENT INSTRUCTIONS
Pap Test: Care Instructions  Your Care Instructions    The Pap test (also called a Pap smear) is a screening test for cancer of the cervix, which is the lower part of the uterus that opens into the vagina. The test can help your doctor find early changes in the cells that could lead to cancer. The sample of cells taken during your test has been sent to a lab so that an expert can look at the cells. It usually takes a week or two to get the results back. Follow-up care is a key part of your treatment and safety. Be sure to make and go to all appointments, and call your doctor if you are having problems. It's also a good idea to know your test results and keep a list of the medicines you take. What do the results mean? · A normal result means that the test did not find any abnormal cells in the sample. · An abnormal result can mean many things. Most of these are not cancer. The results of your test may be abnormal because:  ¨ You have an infection of the vagina or cervix, such as a yeast infection. ¨ You have an IUD (intrauterine device for birth control). ¨ You have low estrogen levels after menopause that are causing the cells to change. ¨ You have cell changes that may be a sign of precancer or cancer. The results are ranked based on how serious the changes might be. There are many other reasons why you might not get a normal result. If the results were abnormal, you may need to get another test within a few weeks or months. If the results show changes that could be a sign of cancer, you may need a test called a colposcopy, which provides a more complete view of the cervix. Sometimes the lab cannot use the sample because it does not contain enough cells or was not preserved well. If so, you may need to have the test again. This is not common, but it does happen from time to time. When should you call for help?   Watch closely for changes in your health, and be sure to contact your doctor if:  ? · You have vaginal bleeding or pain for more than 2 days after the test. It is normal to have a small amount of bleeding for a day or two after the test.   Where can you learn more? Go to http://osmani-stanley.info/. Enter J194 in the search box to learn more about \"Pap Test: Care Instructions. \"  Current as of: May 12, 2017  Content Version: 11.4  © 4199-7812 [employer]. Care instructions adapted under license by [employer] (which disclaims liability or warranty for this information). If you have questions about a medical condition or this instruction, always ask your healthcare professional. [address] any warranty or liability for your use of this information.

## 2017-11-30 NOTE — MR AVS SNAPSHOT
Visit Information Date & Time Provider Department Dept. Phone Encounter #  
 11/30/2017 10:00 AM Kaitlin Cuevas MD [facility] [phone] [id number] Upcoming Health Maintenance Date Due  
 PAP AKA CERVICAL CYTOLOGY 9/28/2016 Allergies as of 11/30/2017  Review Complete On: 11/30/2017 By: Hermelindo Kothari No Known Allergies Current Immunizations  Never Reviewed Name Date DTaP 10/19/1999, 5/14/1997, 3/25/1996, 1/29/1996, 1995 HPV 9/26/2014, 8/27/2014 HPV (Quad) 4/3/2015 10:36 AM  
 Hep A Vaccine 8/27/2014 Hep B Vaccine 7/5/1996, 1/29/1996, 1995 Hib 9/27/1996, 3/25/1996, 1/29/1996, 1995 Influenza Vaccine 11/24/2010, 11/25/2008, 11/21/2007 MMR 10/19/1999, 1/27/1997 Meningococcal ACWY Vaccine 8/27/2014, 8/21/2007 Pneumococcal Vaccine (Unspecified Type) 10/19/1999, 3/25/1996, 1/29/1996, 1995 Tdap 4/21/2016, 8/12/1997 Varicella Virus Vaccine 8/27/2014, 11/12/1997 Not reviewed this visit You Were Diagnosed With   
  
 Codes Comments Well female exam with routine gynecological exam    -  Primary ICD-10-CM: Z39.212 ICD-9-CM: V72.31 Vitals BP Height(growth percentile) Weight(growth percentile) BMI OB Status Smoking Status 126/72 5' 5\" (1.651 m) 148 lb (67.1 kg) 24.63 kg/m2 IUD Never Smoker BMI and BSA Data Body Mass Index Body Surface Area  
 24.63 kg/m 2 1.75 m 2 Preferred Pharmacy Pharmacy Name Phone [facility] [address], [place] [address] [place] [address] [phone] Your Updated Medication List  
  
   
This list is accurate as of: 11/30/17 10:25 AM.  Always use your most recent med list.  
  
  
  
  
 levonorgestrel 20 mcg/24 hr (5 years) Iud  
Commonly known as:  MIRENA  
1 Each by IntraUTERine route once. magic mouthwash solution Magic mouth wash  Maalox Lidocaine 2% viscous  Diphenhydramine oral solution   Pharmacy to mix equal portions of ingredients to a total volume as indicated in the dispense amount. TYLENOL 325 mg tablet Generic drug:  acetaminophen Take  by mouth every four (4) hours as needed for Pain.  
  
 white petrolatum topical onitment Commonly known as:  VASELINE Apply  to affected area as needed for Lubrication. We Performed the Following PAP IG, CT-NG TV [address] HPV [id number], [id number]) [id number] CPT(R)] Patient Instructions Pap Test: Care Instructions Your Care Instructions The Pap test (also called a Pap smear) is a screening test for cancer of the cervix, which is the lower part of the uterus that opens into the vagina. The test can help your doctor find early changes in the cells that could lead to cancer. The sample of cells taken during your test has been sent to a lab so that an expert can look at the cells. It usually takes a week or two to get the results back. Follow-up care is a key part of your treatment and safety. Be sure to make and go to all appointments, and call your doctor if you are having problems. It's also a good idea to know your test results and keep a list of the medicines you take. What do the results mean? · A normal result means that the test did not find any abnormal cells in the sample. · An abnormal result can mean many things. Most of these are not cancer. The results of your test may be abnormal because: 
¨ You have an infection of the vagina or cervix, such as a yeast infection. ¨ You have an IUD (intrauterine device for birth control). ¨ You have low estrogen levels after menopause that are causing the cells to change. ¨ You have cell changes that may be a sign of precancer or cancer. The results are ranked based on how serious the changes might be. There are many other reasons why you might not get a normal result.  If the results were abnormal, you may need to get another test within a few weeks or months. If the results show changes that could be a sign of cancer, you may need a test called a colposcopy, which provides a more complete view of the cervix. Sometimes the lab cannot use the sample because it does not contain enough cells or was not preserved well. If so, you may need to have the test again. This is not common, but it does happen from time to time. When should you call for help? Watch closely for changes in your health, and be sure to contact your doctor if: 
? · You have vaginal bleeding or pain for more than 2 days after the test. It is normal to have a small amount of bleeding for a day or two after the test.  
Where can you learn more? Go to http://osmani-stanley.info/. Enter J865 in the search box to learn more about \"Pap Test: Care Instructions. \" Current as of: May 12, 2017 Content Version: 11.4 © 9958-8690 [employer]. Care instructions adapted under license by [employer] (which disclaims liability or warranty for this information). If you have questions about a medical condition or this instruction, always ask your healthcare professional. [place] [address] any warranty or liability for your use of this information. Introducing [facility]! Jorge Plunkett introduces [employer] patient portal. Now you can access parts of your medical record, email your doctor's office, and request medication refills online. 1. In your internet browser, go to [email or web address] 2. Click on the First Time User? Click Here link in the Sign In box. You will see the New Member Sign Up page. 3. Enter your [employer] Access Code exactly as it appears below. You will not need to use this code after youve completed the sign-up process. If you do not sign up before the expiration date, you must request a new code. · [employer] Access Code: [email or web address] Expires: 2/4/2018 10:01 AM 
 
 4. Enter the last four digits of your Social Security Number (xxxx) and Date of Birth (mm/dd/yyyy) as indicated and click Submit. You will be taken to the next sign-up page. 5. Create a [employer] ID. This will be your [employer] login ID and cannot be changed, so think of one that is secure and easy to remember. 6. Create a [employer] password. You can change your password at any time. 7. Enter your Password Reset Question and Answer. This can be used at a later time if you forget your password. 8. Enter your e-mail address. You will receive e-mail notification when new information is available in [address] 9. Click Sign Up. You can now view and download portions of your medical record. 10. Click the Download Summary menu link to download a portable copy of your medical information. If you have questions, please visit the Frequently Asked Questions section of the [employer] website. Remember, [employer] is NOT to be used for urgent needs. For medical emergencies, dial 911. Now available from your iPhone and Android! Please provide this summary of care documentation to your next provider. Your primary care clinician is listed as Alvarado Cervantes. If you have any questions after today's visit, please call [phone].

## 2017-11-30 NOTE — PROGRESS NOTES
Annual exam ages 21-44    Jill Kilpatrick is a [de-identified] ,  25 y.o. female [facility] No LMP recorded. Patient is not currently having periods (Reason: IUD). .    She presents for her annual checkup. She is having no significant problems. With regard to the Gardasil vaccine, she has received all 3 injections. Menstrual status:    Her periods are spotting in flow. She is using essentially none pads or tampons per day, very light to nonexistent due to IUD. She denies dysmenorrhea. She reports no premenstrual symptoms. Contraception:    The current method of family planning is IUD. Sexual history:     She  reports that she currently engages in sexual activity and has had male partners. She reports using the following method of birth control/protection: IUD. Soledad Gearing Medical conditions:    Since her last annual GYN exam about one year ago, she has not the following changes in her health history: none. Pap and Mammogram History:    She has never had a pap smear. The patient has never had a mammogram.    Breast Cancer History/Substance Abuse: negative    Past Medical History:   Diagnosis Date    Acne     Dysmenorrhea     Encounter for insertion of mirena IUD 09/2016    Menorrhagia      Past Surgical History:   Procedure Laterality Date    HX TYMPANOSTOMY      HX WISDOM TEETH EXTRACTION         Current Outpatient Prescriptions   Medication Sig Dispense Refill    levonorgestrel (MIRENA) 20 mcg/24 hr (5 years) IUD 1 Each by IntraUTERine route once.  acetaminophen (TYLENOL) 325 mg tablet Take  by mouth every four (4) hours as needed for Pain.  magic mouthwash solution Magic mouth wash   Maalox  Lidocaine 2% viscous   Diphenhydramine oral solution     Pharmacy to mix equal portions of ingredients to a total volume as indicated in the dispense amount. 300 mL 0    white petrolatum (VASELINE) topical onitment Apply  to affected area as needed for Lubrication.  20 g 0 Allergies: Review of patient's allergies indicates no known allergies. Tobacco History:  reports that she has never smoked. She has never used smokeless tobacco.  Alcohol Abuse:  reports that she does not drink alcohol. Drug Abuse:  reports that she does not use illicit drugs.     Family Medical/Cancer History:   Family History   Problem Relation Age of Onset    Diabetes Mother     Hypertension Mother     Cancer Maternal Aunt     Diabetes Maternal Grandmother     Diabetes Maternal Grandfather         Review of Systems - History obtained from the patient  Constitutional: negative for weight loss, fever, night sweats  HEENT: negative for hearing loss, earache, congestion, snoring, sorethroat  CV: negative for chest pain, palpitations, edema  Resp: negative for cough, shortness of breath, wheezing  GI: negative for change in bowel habits, abdominal pain, black or bloody stools  : negative for frequency, dysuria, hematuria, vaginal discharge  MSK: negative for back pain, joint pain, muscle pain  Breast: negative for breast lumps, nipple discharge, galactorrhea  Skin :negative for itching, rash, hives  Neuro: negative for dizziness, headache, confusion, weakness  Psych: negative for anxiety, depression, change in mood  Heme/lymph: negative for bleeding, bruising, pallor    Physical Exam    Visit Vitals    /72    Ht 5' 5\" (1.651 m)    Wt 148 lb (67.1 kg)    BMI 24.63 kg/m2       Constitutional  · Appearance: well-nourished, well developed, alert, in no acute distress    HENT  · Head and Face: appears normal    Neck  · Inspection/Palpation: normal appearance, no masses or tenderness  · Lymph Nodes: no lymphadenopathy present  · Thyroid: gland size normal, nontender, no nodules or masses present on palpation    Chest  · Respiratory Effort: breathing unlabored    Breasts  · Inspection of Breasts: breasts symmetrical, no skin changes, no discharge present, nipple appearance normal, no skin retraction present  · Palpation of Breasts and Axillae: no masses present on palpation, no breast tenderness  · Axillary Lymph Nodes: no lymphadenopathy present    Gastrointestinal  · Abdominal Examination: abdomen non-tender to palpation, normal bowel sounds, no masses present  · Liver and spleen: no hepatomegaly present, spleen not palpable  · Hernias: no hernias identified    Genitourinary  · External Genitalia: normal appearance for age, no discharge present, no tenderness present, no inflammatory lesions present, no masses present, no atrophy present  · Vagina: normal vaginal vault without central or paravaginal defects, no discharge present, no inflammatory lesions present, no masses present  · Bladder: non-tender to palpation  · Urethra: appears normal  · Cervix: normal, iud strings appropriate length   · Uterus: normal size, shape and consistency  · Adnexa: no adnexal tenderness present, no adnexal masses present  · Perineum: perineum within normal limits, no evidence of trauma, no rashes or skin lesions present  · Anus: anus within normal limits, no hemorrhoids present  · Inguinal Lymph Nodes: no lymphadenopathy present    Skin  · General Inspection: no rash, no lesions identified    Neurologic/Psychiatric  · Mental Status:  · Orientation: grossly oriented to person, place and time  · Mood and Affect: mood normal, affect appropriate    . Assessment:  Routine gynecologic examination  Her current medical status is satisfactory with no evidence of significant gynecologic issues.     Plan:  Counseled re: diet, exercise, healthy lifestyle  Return for yearly wellness visits  Pt counseled regarding co-testing for high risk HPV with pap  Rec screening mammo at either 35 or 40

## 2017-12-07 LAB
C TRACH RRNA CVX QL NAA+PROBE: NEGATIVE
CYTOLOGIST CVX/VAG CYTO: NORMAL
CYTOLOGY CVX/VAG DOC THIN PREP: NORMAL
DX ICD CODE: NORMAL
LABCORP, 190119: NORMAL
Lab: NORMAL
Lab: NORMAL
N GONORRHOEA RRNA CVX QL NAA+PROBE: NEGATIVE
OTHER STN SPEC: NORMAL
PATH REPORT.FINAL DX SPEC: NORMAL
STAT OF ADQ CVX/VAG CYTO-IMP: NORMAL
T VAGINALIS RRNA SPEC QL NAA+PROBE: NEGATIVE

## 2019-01-23 ENCOUNTER — OFFICE VISIT (OUTPATIENT)
Dept: FAMILY MEDICINE CLINIC | Age: 24
End: 2019-01-23

## 2019-01-23 VITALS
DIASTOLIC BLOOD PRESSURE: 82 MMHG | RESPIRATION RATE: 16 BRPM | TEMPERATURE: 98.8 F | BODY MASS INDEX: 27.66 KG/M2 | HEART RATE: 99 BPM | OXYGEN SATURATION: 99 % | HEIGHT: 65 IN | WEIGHT: 166 LBS | SYSTOLIC BLOOD PRESSURE: 126 MMHG

## 2019-01-23 DIAGNOSIS — H93.8X3 EAR FULLNESS, BILATERAL: Primary | ICD-10-CM

## 2019-01-23 RX ORDER — FLUTICASONE PROPIONATE 50 MCG
1 SPRAY, SUSPENSION (ML) NASAL DAILY
Qty: 1 BOTTLE | Refills: 0 | Status: SHIPPED | OUTPATIENT
Start: 2019-01-23

## 2019-01-23 RX ORDER — CETIRIZINE HCL 10 MG
10 TABLET ORAL
Qty: 30 TAB | Refills: 1 | Status: SHIPPED | OUTPATIENT
Start: 2019-01-23

## 2019-01-23 NOTE — PROGRESS NOTES
Chief Complaint   Patient presents with    Ear Pain     1. Have you been to the ER, urgent care clinic since your last visit? Hospitalized since your last visit? No    2. Have you seen or consulted any other health care providers outside of the [address] [place] [address] since your last visit? Include any pap smears or colon screening.  No

## 2019-01-24 NOTE — PROGRESS NOTES
Subjective  CC: Joselyn Padilla is an 21 y.o. female presents for ear discomfort    Symptoms started 2 days ago. It is a new problem but she has had ear discomfort in the past. She does not know if if it could be wax buildup or ear infection. Both ear are bothering her but the left more than the right. No congestion, cough, or fever/chills. She reports that sounds are slightly diminished which is how it felt when she had to have her ears cleaned out. Presence of sore throat that is worse when waking up in the morning. Allergies - reviewed:   No Known Allergies      Medications - reviewed:   Current Outpatient Medications   Medication Sig    cetirizine (ZYRTEC) 10 mg tablet Take 1 Tab by mouth nightly.  fluticasone (FLONASE) 50 mcg/actuation nasal spray 1 [place] by Both Nostrils route daily. Use nightly for a minimal of 2 weeks.  levonorgestrel (MIRENA) 20 mcg/24 hr (5 years) IUD 1 Each by IntraUTERine route once.  acetaminophen (TYLENOL) 325 mg tablet Take  by mouth every four (4) hours as needed for Pain.  magic mouthwash solution Magic mouth wash   Maalox  Lidocaine 2% viscous   Diphenhydramine oral solution     Pharmacy to mix equal portions of ingredients to a total volume as indicated in the dispense amount.  white petrolatum (VASELINE) topical onitment Apply  to affected area as needed for Lubrication. No current facility-administered medications for this visit.           Past Medical History - reviewed:  Past Medical History:   Diagnosis Date    Acne     Dysmenorrhea     Encounter for insertion of mirena IUD 09/2016    Menorrhagia          Immunizations - reviewed:   Immunization History   Administered Date(s) Administered    DTaP 1995, 01/29/1996, 03/25/1996, 05/14/1997, 10/19/1999    HPV 08/27/2014, 09/26/2014    HPV (Quad) 04/03/2015    Hep A Vaccine 08/27/2014    Hep B Vaccine 1995, 01/29/1996, 07/05/1996    Hib 1995, 01/29/1996, 03/25/1996, 09/27/1996    Influenza Vaccine 11/21/2007, 11/25/2008, 11/24/2010    MMR 01/27/1997, 10/19/1999    Meningococcal ACWY Vaccine 08/21/2007, 08/27/2014    Pneumococcal Vaccine (Unspecified Type) 1995, 01/29/1996, 03/25/1996, 10/19/1999    Tdap 08/12/1997, 04/21/2016    Varicella Virus Vaccine 11/12/1997, 08/27/2014         ROS  Review of Systems : A complete review of systems was performed and is negative except for those mentioned in the HPI. Physical Exam  Visit Vitals  /82 (BP 1 Location: Left arm, BP Patient Position: Sitting)   Pulse 99   Temp 98.8 °F (37.1 °C) (Oral)   Resp 16   Ht 5' 5\" (1.651 m)   Wt 166 lb (75.3 kg)   SpO2 99%   BMI 27.62 kg/m²       General appearance - Alert, NAD. Head: Atraumatic. Normocephalic. No lymphadenopathy  Eyes: EOMI. Sclera white. Ears: Hearing grossly normal. TM visualized in its entirety. Non erythematous with no effusion, very mild cerumen present in right canal. Good TM mobility on   Nose: Nares patent, no polyps, erythematous nasal passages with some mild erythema  Throat: pharynx clear, no exudates. Cobblestoning with mild mucous drainage. Respiratory - LCTAB. No wheeze/rale/rhonchi  Heart - Normal rate, regular rhythm. No m/r/r  Extremities - No LE edema. Distal pulses intact  Skin - normal coloration and normal turgor. No cyanosis, no rash. Assessment/Plan  1. Ear fullness, bilateral - patient likely experiencing eustachian tube dysfunction from nasal congestion. Exam concerning for an allergic component. - cetirizine (ZYRTEC) 10 mg tablet; Take 1 Tab by mouth nightly. Dispense: 30 Tab; Refill: 1  - fluticasone (FLONASE) 50 mcg/actuation nasal spray; 1 [place] by Both Nostrils route daily. Use nightly for a minimal of 2 weeks. Dispense: 1 Bottle; Refill: 0      Follow-up Disposition:  Return if symptoms worsen or fail to improve. I have discussed the aforementioned diagnoses and plan with the patient in detail.  I have provided information in person and/or in AVS. All questions answered prior to discharge.     Kenny Murphy MD  Family Medicine Resident  PGY 3

## 2019-01-24 NOTE — PATIENT INSTRUCTIONS
Try a sinus rinse kit (for example lester pot) to help move secretions from your sinus.     START using Flonase 2 sprays in each nostril twice a day for 1 week.     START Zyrtec daily. Saline Nasal Washes: Care Instructions  Your Care Instructions  Saline nasal washes help keep the nasal passages open by washing out thick or dried mucus. This simple remedy can help relieve symptoms of allergies, sinusitis, and colds. It also can make the nose feel more comfortable by keeping the mucous membranes moist. You may notice a little burning sensation in your nose the first few times you use the solution, but this usually gets better in a few days. Follow-up care is a key part of your treatment and safety. Be sure to make and go to all appointments, and call your doctor if you are having problems. It's also a good idea to know your test results and keep a list of the medicines you take. How can you care for yourself at home? · You can buy premixed saline solution in a squeeze bottle or other sinus rinse products at a drugstore. Read and follow the instructions on the label. · You also can make your own saline solution by adding 1 teaspoon of salt and 1 teaspoon of baking soda to 2 cups of distilled water. · If you use a homemade solution, pour a small amount into a clean bowl. Using a rubber bulb syringe, squeeze the syringe and place the tip in the salt water. Pull a small amount of the salt water into the syringe by relaxing your hand. · Sit down with your head tilted slightly back. Do not lie down. Put the tip of the bulb syringe or the squeeze bottle a little way into one of your nostrils. Gently drip or squirt a few drops into the nostril. Repeat with the other nostril. Some sneezing and gagging are normal at first.  · Gently blow your nose. · Wipe the syringe or bottle tip clean after each use. · Repeat this 2 or 3 times a day. · Use nasal washes gently if you have nosebleeds often.   When should you call for help? Watch closely for changes in your health, and be sure to contact your doctor if:    · You often get nosebleeds.     · You have problems doing the nasal washes. Where can you learn more? Go to http://osmani-stanley.info/. Enter [phone] [id number] 47 in the search box to learn more about \"Saline Nasal Washes: Care Instructions. \"  Current as of: March 28, 2018  Content Version: 11.8  © 9300-8225 [employer]. Care instructions adapted under license by [employer] (which disclaims liability or warranty for this information). If you have questions about a medical condition or this instruction, always ask your healthcare professional. [address] any warranty or liability for your use of this information.

## 2019-06-25 ENCOUNTER — OFFICE VISIT (OUTPATIENT)
Dept: OBGYN CLINIC | Age: 24
End: 2019-06-25

## 2019-06-25 VITALS — SYSTOLIC BLOOD PRESSURE: 133 MMHG | BODY MASS INDEX: 27.96 KG/M2 | DIASTOLIC BLOOD PRESSURE: 78 MMHG | WEIGHT: 168 LBS

## 2019-06-25 DIAGNOSIS — Z01.419 ENCOUNTER FOR GYNECOLOGICAL EXAMINATION WITHOUT ABNORMAL FINDING: Primary | ICD-10-CM

## 2019-06-25 NOTE — PROGRESS NOTES
Annual exam ages 21-44    Kita Luu is a [de-identified] ,  21 y.o. female [facility] No LMP recorded. (Menstrual status: IUD). .    She presents for her annual checkup. She is having no significant problems. With regard to the Gardasil vaccine, she has received all 3 injections. Menstrual status:    Her periods are nonexistent in flow. She denies dysmenorrhea. She reports no premenstrual symptoms. Contraception:    The current method of family planning is IUD. Sexual history:     She  reports that she currently engages in sexual activity and has had partners who are Male. She reports using the following method of birth control/protection: IUD. Jermain Monroe Medical conditions:    Since her last annual GYN exam about two years ago, she has not the following changes in her health history: none. Pap and Mammogram History:    Her most recent Pap smear was normal, obtained 2 year(s) ago. The patient has never had a mammogram.    Breast Cancer History/Substance Abuse: negative    Past Medical History:   Diagnosis Date    Acne     Dysmenorrhea     Encounter for insertion of mirena IUD 09/2016    Menorrhagia     Pap smear for cervical cancer screening 11/30/17 neg     Past Surgical History:   Procedure Laterality Date    HX TYMPANOSTOMY      HX WISDOM TEETH EXTRACTION         Current Outpatient Medications   Medication Sig Dispense Refill    cetirizine (ZYRTEC) 10 mg tablet Take 1 Tab by mouth nightly. 30 Tab 1    fluticasone (FLONASE) 50 mcg/actuation nasal spray 1 [place] by Both Nostrils route daily. Use nightly for a minimal of 2 weeks. 1 Bottle 0    levonorgestrel (MIRENA) 20 mcg/24 hr (5 years) IUD 1 Each by IntraUTERine route once. Allergies: Patient has no known allergies. Tobacco History:  reports that she has never smoked. She has never used smokeless tobacco.  Alcohol Abuse:  reports that she does not drink alcohol.   Drug Abuse:  reports that she does not use drugs.     Family Medical/Cancer History:   Family History   Problem Relation Age of Onset    Diabetes Mother     Hypertension Mother     Cancer Maternal Aunt     Diabetes Maternal Grandmother     Diabetes Maternal Grandfather         Review of Systems - History obtained from the patient  Constitutional: negative for weight loss, fever, night sweats  HEENT: negative for hearing loss, earache, congestion, snoring, sorethroat  CV: negative for chest pain, palpitations, edema  Resp: negative for cough, shortness of breath, wheezing  GI: negative for change in bowel habits, abdominal pain, black or bloody stools  : negative for frequency, dysuria, hematuria, vaginal discharge  MSK: negative for back pain, joint pain, muscle pain  Breast: negative for breast lumps, nipple discharge, galactorrhea  Skin :negative for itching, rash, hives  Neuro: negative for dizziness, headache, confusion, weakness  Psych: negative for anxiety, depression, change in mood  Heme/lymph: negative for bleeding, bruising, pallor    Physical Exam    Visit Vitals  /78   Wt 168 lb (76.2 kg)   BMI 27.96 kg/m²       Constitutional  · Appearance: well-nourished, well developed, alert, in no acute distress    HENT  · Head and Face: appears normal    Neck  · Inspection/Palpation: normal appearance, no masses or tenderness  · Lymph Nodes: no lymphadenopathy present  · Thyroid: gland size normal, nontender, no nodules or masses present on palpation    Chest  · Respiratory Effort: breathing unlabored    Breasts  · Inspection of Breasts: breasts symmetrical, no skin changes, no discharge present, nipple appearance normal, no skin retraction present  · Palpation of Breasts and Axillae: no masses present on palpation, no breast tenderness  · Axillary Lymph Nodes: no lymphadenopathy present    Gastrointestinal  · Abdominal Examination: abdomen non-tender to palpation, normal bowel sounds, no masses present  · Liver and spleen: no hepatomegaly present, spleen not palpable  · Hernias: no hernias identified    Genitourinary  · External Genitalia: normal appearance for age, no discharge present, no tenderness present, no inflammatory lesions present, no masses present, no atrophy present  · Vagina: normal vaginal vault without central or paravaginal defects, no discharge present, no inflammatory lesions present, no masses present  · Bladder: non-tender to palpation  · Urethra: appears normal  · Cervix: normal, iud strings appropriate length   · Uterus: normal size, shape and consistency  · Adnexa: no adnexal tenderness present, no adnexal masses present  · Perineum: perineum within normal limits, no evidence of trauma, no rashes or skin lesions present  · Anus: anus within normal limits, no hemorrhoids present  · Inguinal Lymph Nodes: no lymphadenopathy present    Skin  · General Inspection: no rash, no lesions identified    Neurologic/Psychiatric  · Mental Status:  · Orientation: grossly oriented to person, place and time  · Mood and Affect: mood normal, affect appropriate    . Assessment:  Routine gynecologic examination  Her current medical status is satisfactory with no evidence of significant gynecologic issues.     Plan:  Counseled re: diet, exercise, healthy lifestyle  Return for yearly wellness visits  Pt counseled regarding co-testing for high risk HPV with pap  Rec screening mammo at either 35 or 40

## 2019-06-27 LAB
C TRACH RRNA SPEC QL NAA+PROBE: NEGATIVE
N GONORRHOEA RRNA SPEC QL NAA+PROBE: NEGATIVE
T VAGINALIS RRNA VAG QL NAA+PROBE: NEGATIVE

## 2020-10-14 ENCOUNTER — OFFICE VISIT (OUTPATIENT)
Dept: OBGYN CLINIC | Age: 25
End: 2020-10-14
Payer: COMMERCIAL

## 2020-10-14 VITALS — WEIGHT: 184 LBS | BODY MASS INDEX: 30.62 KG/M2 | DIASTOLIC BLOOD PRESSURE: 71 MMHG | SYSTOLIC BLOOD PRESSURE: 135 MMHG

## 2020-10-14 DIAGNOSIS — Z01.419 ENCOUNTER FOR GYNECOLOGICAL EXAMINATION WITHOUT ABNORMAL FINDING: Primary | ICD-10-CM

## 2020-10-14 PROCEDURE — 99395 PREV VISIT EST AGE 18-39: CPT | Performed by: OBSTETRICS & GYNECOLOGY

## 2020-10-14 NOTE — PROGRESS NOTES
Annual exam ages 21-44    Jael Campos is a [de-identified] ,  22 y.o. female   No LMP recorded. (Menstrual status: IUD). She presents for her annual checkup. She is having no significant problems. With regard to the Gardasil vaccine, she has received all 3 injections. Menstrual status:    Her periods are minimal to none using IUD. Contraception:    The current method of family planning is Hormonal IUS. Sexual history:    She  reports being sexually active and has had partner(s) who are Male. She reports using the following method of birth control/protection: I.U.D..    Medical conditions:    Since her last annual GYN exam about one year ago, she has not the following changes in her health history: none. Surgical history confirmed with patient. has a past surgical history that includes hx wisdom teeth extraction and hx tympanostomy. Pap and Mammogram History:    Her most recent Pap smear was normal, obtained 3 year(s) ago. The patient has never had a mammogram.    Breast Cancer History/Substance Abuse: negative    Past Medical History:   Diagnosis Date    Acne     Dysmenorrhea     Encounter for insertion of mirena IUD 09/2016    Menorrhagia     Pap smear for cervical cancer screening 11/30/17 neg     Past Surgical History:   Procedure Laterality Date    HX TYMPANOSTOMY      HX WISDOM TEETH EXTRACTION         Current Outpatient Medications   Medication Sig Dispense Refill    cetirizine (ZYRTEC) 10 mg tablet Take 1 Tab by mouth nightly. 30 Tab 1    fluticasone (FLONASE) 50 mcg/actuation nasal spray 1 [place] by Both Nostrils route daily. Use nightly for a minimal of 2 weeks. 1 Bottle 0    levonorgestrel (MIRENA) 20 mcg/24 hr (5 years) IUD 1 Each by IntraUTERine route once. Allergies: Patient has no known allergies. Tobacco History:  reports that she has never smoked. She has never used smokeless tobacco.  Alcohol Abuse:  reports no history of alcohol use.   Drug Abuse:  reports no history of drug use. Family Medical/Cancer History:   Family History   Problem Relation Age of Onset    Diabetes Mother     Hypertension Mother     Cancer Maternal Aunt     Diabetes Maternal Grandmother     Diabetes Maternal Grandfather         Review of Systems - History obtained from the patient  Constitutional: negative for weight loss, fever, night sweats  HEENT: negative for hearing loss, earache, congestion, snoring, sorethroat  CV: negative for chest pain, palpitations, edema  Resp: negative for cough, shortness of breath, wheezing  GI: negative for change in bowel habits, abdominal pain, black or bloody stools  : negative for frequency, dysuria, hematuria, vaginal discharge  MSK: negative for back pain, joint pain, muscle pain  Breast: negative for breast lumps, nipple discharge, galactorrhea  Skin :negative for itching, rash, hives  Neuro: negative for dizziness, headache, confusion, weakness  Psych: negative for anxiety, depression, change in mood  Heme/lymph: negative for bleeding, bruising, pallor    Physical Exam    There were no vitals taken for this visit.     Constitutional  · Appearance: well-nourished, well developed, alert, in no acute distress    HENT  · Head and Face: appears normal    Neck  · Inspection/Palpation: normal appearance, no masses or tenderness  · Lymph Nodes: no lymphadenopathy present  · Thyroid: gland size normal, nontender, no nodules or masses present on palpation    Chest  · Respiratory Effort: breathing unlabored    Breasts  · Inspection of Breasts: breasts symmetrical, no skin changes, no discharge present, nipple appearance normal, no skin retraction present  · Palpation of Breasts and Axillae: no masses present on palpation, no breast tenderness  · Axillary Lymph Nodes: no lymphadenopathy present    Gastrointestinal  · Abdominal Examination: abdomen non-tender to palpation, normal bowel sounds, no masses present  · Liver and spleen: no hepatomegaly present, spleen not palpable  · Hernias: no hernias identified    Genitourinary  · External Genitalia: normal appearance for age, no discharge present, no tenderness present, no inflammatory lesions present, no masses present, no atrophy present  · Vagina: normal vaginal vault without central or paravaginal defects, no discharge present, no inflammatory lesions present, no masses present  · Bladder: non-tender to palpation  · Urethra: appears normal  · Cervix: normal   · Uterus: normal size, shape and consistency  · Adnexa: no adnexal tenderness present, no adnexal masses present  · Perineum: perineum within normal limits, no evidence of trauma, no rashes or skin lesions present  · Anus: anus within normal limits, no hemorrhoids present  · Inguinal Lymph Nodes: no lymphadenopathy present    Skin  · General Inspection: no rash, no lesions identified    Neurologic/Psychiatric  · Mental Status:  · Orientation: grossly oriented to person, place and time  · Mood and Affect: mood normal, affect appropriate    . Assessment:  Routine gynecologic examination  Her current medical status is satisfactory with no evidence of significant gynecologic issues.     Plan:  Counseled re: diet, exercise, healthy lifestyle  Return for yearly wellness visits

## 2020-10-21 LAB
C TRACH RRNA CVX QL NAA+PROBE: NEGATIVE
CYTOLOGIST CVX/VAG CYTO: NORMAL
CYTOLOGY CVX/VAG DOC CYTO: NORMAL
CYTOLOGY CVX/VAG DOC THIN PREP: NORMAL
CYTOLOGY HISTORY:: NORMAL
DX ICD CODE: NORMAL
LABCORP, 190119: NORMAL
Lab: NORMAL
N GONORRHOEA RRNA CVX QL NAA+PROBE: NEGATIVE
OTHER STN SPEC: NORMAL
STAT OF ADQ CVX/VAG CYTO-IMP: NORMAL
T VAGINALIS RRNA SPEC QL NAA+PROBE: NEGATIVE

## 2022-12-27 NOTE — PROGRESS NOTES
Santiago Jarquin is a 32 y.o. female returns for an annual exam     Chief Complaint   Patient presents with    Well Woman       No LMP recorded. (Menstrual status: IUD). Her periods are absent. Problems: no significant problems  Birth Control: IUD. Last Pap: normal obtained 2 year(s) ago. She does not have a history of NITHIN 2, 3 or cervical cancer. With regard to the Gardisil vaccine, she has received all 3 injections. 1. Have you been to the ER, urgent care clinic, or hospitalized since your last visit? No    2. Have you seen or consulted any other health care providers outside of the [address] [place] [address] since your last visit? No    Examination chaperoned by Anjelica Fuentes CMA.

## 2022-12-28 ENCOUNTER — OFFICE VISIT (OUTPATIENT)
Dept: OBGYN CLINIC | Age: 27
End: 2022-12-28
Payer: COMMERCIAL

## 2022-12-28 VITALS — SYSTOLIC BLOOD PRESSURE: 118 MMHG | WEIGHT: 213 LBS | BODY MASS INDEX: 35.45 KG/M2 | DIASTOLIC BLOOD PRESSURE: 76 MMHG

## 2022-12-28 DIAGNOSIS — Z01.419 ENCOUNTER FOR GYNECOLOGICAL EXAMINATION WITHOUT ABNORMAL FINDING: Primary | ICD-10-CM

## 2022-12-28 PROCEDURE — 99395 PREV VISIT EST AGE 18-39: CPT | Performed by: OBSTETRICS & GYNECOLOGY

## 2022-12-28 RX ORDER — ESCITALOPRAM OXALATE 10 MG/1
10 TABLET ORAL DAILY
COMMUNITY
Start: 2022-11-04

## 2022-12-28 RX ORDER — SPIRONOLACTONE 50 MG/1
TABLET, FILM COATED ORAL
COMMUNITY
Start: 2022-12-26

## 2022-12-28 NOTE — PROGRESS NOTES
Annual exam  Abena Mercer is a [de-identified] ,  32 y.o. female   No LMP recorded. (Menstrual status: IUD). She presents for her annual checkup. She is having no significant problems. Per Nursing Note:  No LMP recorded. (Menstrual status: IUD). Her periods are absent. Problems: no significant problems  Birth Control: IUD. Last Pap: normal obtained 2 year(s) ago. She does not have a history of NITHIN 2, 3 or cervical cancer. With regard to the Gardisil vaccine, she has received all 3 injections. Past Medical History:   Diagnosis Date    Acne     Dysmenorrhea     Encounter for insertion of mirena IUD 09/2016    Menorrhagia     Pap smear for cervical cancer screening 11/30/17 neg     Past Surgical History:   Procedure Laterality Date    HX TYMPANOSTOMY      HX WISDOM TEETH EXTRACTION         Current Outpatient Medications   Medication Sig Dispense Refill    spironolactone (ALDACTONE) 50 mg tablet       escitalopram oxalate (LEXAPRO) 10 mg tablet Take 10 mg by mouth daily. levonorgestrel (MIRENA) 20 mcg/24 hr (5 years) IUD 1 Each by IntraUTERine route once. cetirizine (ZYRTEC) 10 mg tablet Take 1 Tab by mouth nightly. (Patient not taking: Reported on 12/28/2022) 30 Tab 1    fluticasone (FLONASE) 50 mcg/actuation nasal spray 1 [place] by Both Nostrils route daily. Use nightly for a minimal of 2 weeks. (Patient not taking: Reported on 12/28/2022) 1 Bottle 0     Allergies: Patient has no known allergies. Tobacco History:  reports that she has never smoked. She has never used smokeless tobacco.  Alcohol Abuse:  reports no history of alcohol use. Drug Abuse:  reports no history of drug use.     Family Medical/Cancer History:   Family History   Problem Relation Age of Onset    Diabetes Mother     Hypertension Mother     Cancer Maternal Aunt     Diabetes Maternal Grandmother     Diabetes Maternal Grandfather         Review of Systems - History obtained from the patient  Constitutional: negative for weight loss, fever, night sweats  HEENT: negative for hearing loss, earache, congestion, snoring, sorethroat  CV: negative for chest pain, palpitations, edema  Resp: negative for cough, shortness of breath, wheezing  GI: negative for change in bowel habits, abdominal pain, black or bloody stools  : negative for frequency, dysuria, hematuria, vaginal discharge  MSK: negative for back pain, joint pain, muscle pain  Breast: negative for breast lumps, nipple discharge, galactorrhea  Skin :negative for itching, rash, hives  Neuro: negative for dizziness, headache, confusion, weakness  Psych: negative for anxiety, depression, change in mood  Heme/lymph: negative for bleeding, bruising, pallor    Physical Exam    Visit Vitals  /76   Wt 213 lb (96.6 kg)   BMI 35.45 kg/m²       Constitutional  Appearance: well-nourished, well developed, alert, in no acute distress    HENT  Head and Face: appears normal    Neck  Inspection/Palpation: normal appearance, no masses or tenderness  Lymph Nodes: no lymphadenopathy present  Thyroid: gland size normal, nontender, no nodules or masses present on palpation    Chest  Respiratory Effort: breathing unlabored    Breasts  Inspection of Breasts: breasts symmetrical, no skin changes, no discharge present, nipple appearance normal, no skin retraction present  Palpation of Breasts and Axillae: no masses present on palpation, no breast tenderness  Axillary Lymph Nodes: no lymphadenopathy present    Gastrointestinal  Abdominal Examination: abdomen non-tender to palpation, normal bowel sounds, no masses present  Liver and spleen: no hepatomegaly present, spleen not palpable  Hernias: no hernias identified    Genitourinary  External Genitalia: normal appearance for age, no discharge present, no tenderness present, no inflammatory lesions present, no masses present, no atrophy present  Vagina: normal vaginal vault without central or paravaginal defects, no discharge present, no inflammatory lesions present, no masses present  Bladder: non-tender to palpation  Urethra: appears normal  Cervix: normal   Uterus: normal size, shape and consistency  Adnexa: no adnexal tenderness present, no adnexal masses present  Perineum: perineum within normal limits, no evidence of trauma, no rashes or skin lesions present  Anus: anus within normal limits, no hemorrhoids present  Inguinal Lymph Nodes: no lymphadenopathy present    Skin  General Inspection: no rash, no lesions identified    Neurologic/Psychiatric  Mental Status:  Orientation: grossly oriented to person, place and time  Mood and Affect: mood normal, affect appropriate    Assessment:  Routine gynecologic examination  Her current medical status is satisfactory with no evidence of significant gynecologic issues.     Plan:  Counseled re: diet, exercise, healthy lifestyle  Return for yearly wellness visits

## 2023-05-19 RX ORDER — ESCITALOPRAM OXALATE 10 MG/1
10 TABLET ORAL DAILY
COMMUNITY
Start: 2022-11-04

## 2023-05-19 RX ORDER — FLUTICASONE PROPIONATE 50 MCG
1 SPRAY, SUSPENSION (ML) NASAL DAILY
COMMUNITY
Start: 2019-01-23

## 2023-05-19 RX ORDER — SPIRONOLACTONE 50 MG/1
TABLET, FILM COATED ORAL
COMMUNITY
Start: 2022-12-26

## 2023-05-19 RX ORDER — CETIRIZINE HYDROCHLORIDE 10 MG/1
10 TABLET ORAL
COMMUNITY
Start: 2019-01-23

## 2024-09-18 ENCOUNTER — OFFICE VISIT (OUTPATIENT)
Age: 29
End: 2024-09-18
Payer: COMMERCIAL

## 2024-09-18 VITALS — HEART RATE: 86 BPM | SYSTOLIC BLOOD PRESSURE: 125 MMHG | WEIGHT: 229 LBS | DIASTOLIC BLOOD PRESSURE: 82 MMHG

## 2024-09-18 DIAGNOSIS — Z01.419 ENCOUNTER FOR GYNECOLOGICAL EXAMINATION WITHOUT ABNORMAL FINDING: Primary | ICD-10-CM

## 2024-09-18 DIAGNOSIS — Z12.4 CERVICAL CANCER SCREENING: ICD-10-CM

## 2024-09-18 PROCEDURE — 99395 PREV VISIT EST AGE 18-39: CPT | Performed by: OBSTETRICS & GYNECOLOGY

## 2024-09-21 LAB
., LABCORP: NORMAL
CYTOLOGIST CVX/VAG CYTO: NORMAL
CYTOLOGY CVX/VAG DOC CYTO: NORMAL
CYTOLOGY CVX/VAG DOC THIN PREP: NORMAL
DX ICD CODE: NORMAL
Lab: NORMAL
OTHER STN SPEC: NORMAL
STAT OF ADQ CVX/VAG CYTO-IMP: NORMAL

## 2024-10-24 NOTE — PROGRESS NOTES
Jannie Palma is a 29 y.o. female presents for a problem visit.    Chief Complaint   Patient presents with    IUD replacement        Problems:  IUD removal and reinsertion        No LMP recorded. (Menstrual status: IUD).    The patient is here for an IUD removal and reinsertion.       Device number [id number], expiration date 12/31/26    Chart reviewed for the following:   Vandana TREJO MA, have reviewed the History, Physical and updated the Allergic reactions for Jannie Palma     TIME OUT performed immediately prior to start of procedure:   Vandana TREJO MA, have performed the following reviews on Jannie Palma prior to the start of the procedure:            * Patient was identified by name and date of birth   * Agreement on procedure being performed was verified  * Risks and Benefits explained to the patient  * Procedure site verified and marked as necessary  * Patient was positioned for comfort  * Consent was signed and verified     Time: 1405    Date of procedure: 10/25/2024    Procedure performed by:  Katlin Amaya MD       Provider assisted by: Vandana Bennett MA    Patient assisted by: self    How tolerated by patient: tolerated the procedure well with no complications    Post Procedural Pain Scale: 4 - Hurts Little More    Comments: none    Examination chaperoned by Vandana Bennett MA.

## 2024-10-25 ENCOUNTER — PROCEDURE VISIT (OUTPATIENT)
Age: 29
End: 2024-10-25

## 2024-10-25 VITALS — WEIGHT: 227 LBS | HEART RATE: 83 BPM | SYSTOLIC BLOOD PRESSURE: 129 MMHG | DIASTOLIC BLOOD PRESSURE: 78 MMHG

## 2024-10-25 DIAGNOSIS — Z30.433 ENCOUNTER FOR IUD REMOVAL AND REINSERTION: Primary | ICD-10-CM

## 2024-10-25 RX ORDER — DICLOFENAC SODIUM 75 MG/1
75 TABLET, DELAYED RELEASE ORAL EVERY 12 HOURS
COMMUNITY
Start: 2024-10-10

## 2024-10-25 NOTE — PROGRESS NOTES
IUD REPLACEMENT    Indications for Removal:  Jannie Palma is a No obstetric history on file.,  29 y.o. female White (non-) whose No LMP recorded. (Menstrual status: IUD).  was on . who presents today for IUD replacement. Her current Mirena IUD was placed 5 years ago. She has not had problems with the IUD.  She requests replacement of the IUD because the IUD effectiveness has . The IUD removal procedure was discussed with the patient and she had no further questions.   Procedure:   The patient was placed in a dorsal lithotomy position and appropriately draped. On bimanual exam the uterus was anterior and normal in size with no tenderness present. A speculum exam was performed and the cervix was visualized. The cervix was prepped with zephiran solution. The IUD string was visualized. Using ring forceps , the string was grasped and the IUD removed intact. The IUD was shown to the patient.     IUD INSERTION  Indications:  The risks, benefits and alternatives of IUD insertion were discussed in detail.  She also has reviewed Mirena information. She has elected to proceed with the insertion today and she states she has no further questions. A urine pregnancy test was negative today.   Procedure:  The pelvic exam revealed normal external genitalia. On bimanual exam the uterus was anteverted and normal in size with no tenderness present. A speculum was inserted into the vagina and the cervix was visualized. The cervix was prepped with zephiran solution. The anterior lip of the cervix was grasped with a single toothed tenaculum. The uterus was sounded with a Keith sound to 7 centimeters. A Kyleena was then inserted without difficulty. The string was cut to 3 centimeters.She experienced a moderate amount of cramping.   Post Procedure Status: She tolerated the procedure well.

## 2024-11-22 ENCOUNTER — OFFICE VISIT (OUTPATIENT)
Age: 29
End: 2024-11-22

## 2024-11-22 VITALS — WEIGHT: 228 LBS | SYSTOLIC BLOOD PRESSURE: 130 MMHG | HEART RATE: 67 BPM | DIASTOLIC BLOOD PRESSURE: 83 MMHG

## 2024-11-22 DIAGNOSIS — Z30.431 IUD CHECK UP: Primary | ICD-10-CM

## 2024-11-22 NOTE — PROGRESS NOTES
Jannie Palma is a 29 y.o. female presents for a problem visit.    Chief Complaint   Patient presents with    IUD check      No LMP recorded. (Menstrual status: IUD).    The patient is here for an IUD check after having it reinserted 4 weeks ago.  No new concerns.       Examination chaperoned by Vandana Bennett MA.  
place and time  Mood and Affect: mood normal, affect appropriate      ASSESSMENT/PLAN:    ICD-10-CM    1. IUD check up  Z30.431           No orders of the defined types were placed in this encounter.    RTO prn if symptoms persist or worsen.  Instructions given to pt.  Handouts given to pt.    Please note that this dictation was completed with [employer], the computer voice recognition software.  Quite often unanticipated grammatical, syntax, homophones, and other interpretive errors are inadvertently transcribed by the computer software.  Please disregard these errors.  Please excuse any errors that have escaped final proofreading.